# Patient Record
Sex: MALE | Race: WHITE | Employment: UNEMPLOYED | URBAN - METROPOLITAN AREA
[De-identification: names, ages, dates, MRNs, and addresses within clinical notes are randomized per-mention and may not be internally consistent; named-entity substitution may affect disease eponyms.]

---

## 2020-01-10 ENCOUNTER — MEDICAL CORRESPONDENCE (OUTPATIENT)
Dept: HEALTH INFORMATION MANAGEMENT | Facility: CLINIC | Age: 55
End: 2020-01-10

## 2020-01-10 ENCOUNTER — TRANSFERRED RECORDS (OUTPATIENT)
Dept: HEALTH INFORMATION MANAGEMENT | Facility: CLINIC | Age: 55
End: 2020-01-10

## 2020-02-04 ENCOUNTER — OFFICE VISIT (OUTPATIENT)
Dept: OPHTHALMOLOGY | Facility: CLINIC | Age: 55
End: 2020-02-04
Attending: OPHTHALMOLOGY
Payer: COMMERCIAL

## 2020-02-04 ENCOUNTER — HOSPITAL ENCOUNTER (OUTPATIENT)
Dept: CT IMAGING | Facility: CLINIC | Age: 55
Discharge: HOME | End: 2020-02-04
Attending: OPHTHALMOLOGY | Admitting: OPHTHALMOLOGY
Payer: COMMERCIAL

## 2020-02-04 DIAGNOSIS — E07.9 THYROID EYE DISEASE: Primary | ICD-10-CM

## 2020-02-04 DIAGNOSIS — E07.9 THYROID EYE DISEASE: ICD-10-CM

## 2020-02-04 DIAGNOSIS — H57.89 THYROID EYE DISEASE: Primary | ICD-10-CM

## 2020-02-04 DIAGNOSIS — H57.89 THYROID EYE DISEASE: ICD-10-CM

## 2020-02-04 DIAGNOSIS — H50.22 HYPOTROPIA OF LEFT EYE: ICD-10-CM

## 2020-02-04 DIAGNOSIS — E05.00 PROPTOSIS DUE TO THYROID DISORDER: Primary | ICD-10-CM

## 2020-02-04 PROCEDURE — 83516 IMMUNOASSAY NONANTIBODY: CPT | Mod: 59 | Performed by: OPHTHALMOLOGY

## 2020-02-04 PROCEDURE — 92285 EXTERNAL OCULAR PHOTOGRAPHY: CPT | Mod: ZF | Performed by: OPHTHALMOLOGY

## 2020-02-04 PROCEDURE — 70480 CT ORBIT/EAR/FOSSA W/O DYE: CPT

## 2020-02-04 PROCEDURE — 83519 RIA NONANTIBODY: CPT | Performed by: OPHTHALMOLOGY

## 2020-02-04 PROCEDURE — 86255 FLUORESCENT ANTIBODY SCREEN: CPT | Performed by: OPHTHALMOLOGY

## 2020-02-04 PROCEDURE — V2718 FRESNELL PRISM PRESS-ON LENS: HCPCS | Mod: ZF | Performed by: OPHTHALMOLOGY

## 2020-02-04 PROCEDURE — G0463 HOSPITAL OUTPT CLINIC VISIT: HCPCS | Mod: 25,ZF

## 2020-02-04 PROCEDURE — 36415 COLL VENOUS BLD VENIPUNCTURE: CPT | Performed by: OPHTHALMOLOGY

## 2020-02-04 PROCEDURE — 92060 SENSORIMOTOR EXAMINATION: CPT | Mod: ZF | Performed by: OPHTHALMOLOGY

## 2020-02-04 PROCEDURE — 83516 IMMUNOASSAY NONANTIBODY: CPT | Performed by: OPHTHALMOLOGY

## 2020-02-04 ASSESSMENT — REFRACTION_WEARINGRX
OD_AXIS: 110
OD_CYLINDER: +1.50
OD_SPHERE: -1.75
OS_ADD: +3.00
OD_ADD: +3.00
OS_CYLINDER: +2.25
OS_SPHERE: -2.75
OS_AXIS: 090
SPECS_TYPE: BIFOCAL

## 2020-02-04 ASSESSMENT — CUP TO DISC RATIO
OD_RATIO: 0.3
OS_RATIO: 0.2

## 2020-02-04 ASSESSMENT — CONF VISUAL FIELD
METHOD: TOYS
OD_NORMAL: 1
OS_NORMAL: 1

## 2020-02-04 ASSESSMENT — MARGIN REFLEX DISTANCE
OS_MRD1: 0.5
OD_MRD2: 10
OS_MRD2: 9
OD_MRD1: 1

## 2020-02-04 ASSESSMENT — TONOMETRY
OS_IOP_MMHG: 27
OD_IOP_MMHG: 21

## 2020-02-04 ASSESSMENT — VISUAL ACUITY
OD_CC: 20/20
OS_CC: 20/25
OS_CC+: -2
OD_CC+: -2
CORRECTION_TYPE: GLASSES
METHOD: SNELLEN - LINEAR

## 2020-02-04 ASSESSMENT — LAGOPHTHALMOS
OS_LAGOPHTHALMOS: 3
OD_LAGOPHTHALMOS: 4

## 2020-02-04 NOTE — NURSING NOTE
Chief Complaint(s) and History of Present Illness(es)     Thyroid Disease     Laterality: both eyes    Onset: gradual    Severity: mild    Frequency: constantly    Course: gradually worsening    Associated symptoms: double vision, dryness and redness    Treatments tried: artificial tears and glasses    Response to treatment: no improvement    Pain scale: 7/10              Comments     TSH-3.91, T4-0.94 (12/27/2019). Hyperthyroidism since 2000, S/P Thyroidectomy 2007. Has diplopia all the day. Images are stacked on top of each other. DV resolves when closes either eye. No prism in gls. He feels his vision is blurry at dn and nr. Has trouble looking out of bifocal. Difficult and painful to move eyes around. HA's constantly. Has red and teary eyes, uses AT's QPM to help. Did not like the AT ointment, felt made eyes worse. Very photophobic, wears sunglasses inside. Has noticed his eyes were very proptotic on diagnosis, but within the past few years they now feel heavy. Sometimes they did not close all the way at night. No FHx.

## 2020-02-04 NOTE — PROGRESS NOTES
HPI: Patient is a 54 year old male sent by Dr. Bernabe for consultation of thyroid eye disease. Diagnosed with Graves disease in 2005. He underwent thyroidectomy in 2007. He reports that he has had proptosis since being diagnosed with Graves disease. He thinks that his proptosis may be slightly worse. He reports having binocular diplopia that has been worse over the last year that is now nearly constant. He notes that around the time of being diagnosed with Graves disease he had eyelid retraction, but over the past 4-5 years has had ptosis. He has had dry eye and foreign body sensation and is using artificial tears at bedtime. Currently smokes 5-10 cigarettes; he has been smoking since he was 13 years old.  He received a pair of glasses in January and is unhappy about the size of the add segment in his glasses. Reports dysphagia and choking sensation over the last year, occasional drooling.  This is better over last few months.      Referring physician: Dr. Bernabe, [facility]    Thyroid history:  Diagnosed when? January 2019  AYERS: NA  Thyroidectomy: 2007 on levothyroxine 112 mcg daily    TSI (date):NA       TSH 3.91 and T4 0.94 12/27/19    Eye symptoms (since when):   Proptosis (better/worse/same since last visit): Initial   Diplopia(better/worse/same since last visit): Initial  Eyelid retraction(better/worse/same since last visit): Initial  Tearing(better/worse/same since last visit): Initial  Redness (better/worse/same since last visit): Initial  Pain ((better/worse/same since last visit): Initial  Pain to move the eyes (better/worse/same since last visit): Initial  Blurred vision: Initial    Ocular history:   Orbital decompression (date, details): NA  Strabismus surgery (date, details): NA  Eyelid surgery (date, details): NA    Exam:   Doreen (base):105/31/30     Better/worse same: Initial  Strabismus (better/worse/same): Initial  Eyelid retraction (better/worse/same): Initial    Boby Jewell is a 54  year old male with the following diagnoses:   1. Thyroid eye disease    2. Hypotropia of left eye       It is my impression that Donovan has active thyroid eye disease with history and findings suggestive of concurrent myasthenia gravis. Fatigue in upgaze and bilaterally weak orbicularis function. CT today with extraocular muscles consistent with thyroid eye disease. Recommend smoking cessation, starting selenium. Recommend continued use of artificial tears four times daily. Obtain baseline TSI and myasthenia antibodies ( Acetylcholine receptor first and then LRP4 / MUSK if acetylcholine receptor antibodies negative). If all antibodies negative then check single fiber electromyography.  Return to clinic in 4 months.        I spent a total of 45 minutes face to face with Boby Jewell during today's office visit.  Over 50% of this time was spent counseling the patient and/or coordinating care regarding his thyroid eye disease and probable myasthenia gravis.    Complete documentation of historical and exam elements from today's encounter can be found in the full encounter summary report (not reduplicated in this progress note).  I personally obtained the chief complaint(s) and history of present illness.  I confirmed and edited as necessary the review of systems, past medical/surgical history, family history, social history, and examination findings as documented by others; and I examined the patient myself.  I personally reviewed the relevant tests, images, and reports as documented above.  I formulated and edited as necessary the assessment and plan and discussed the findings and management plan with the patient and family.  I personally reviewed the ophthalmic test(s) associated with this encounter, agree with the interpretation(s) as documented by the resident/fellow, and have edited the corresponding report(s) as necessary.     MD Gerry Cardozo MD  Ophthalmology, PGY-5  Neuro-Ophthalmology  Fellow

## 2020-02-04 NOTE — LETTER
2020    RE: Boby Jewell  : 1965  MRN: [id number]    Dear Dr. Bernabe:    Thank you for referring your patient, Boby Jewell, to our multi-disciplinary thyroid eye disease clinic recently.  After a thorough history followed by a neuro-ophthalmology, strabismus, and oculoplastics examination, we came to the following conclusions:     HPI: Patient is a 54 year old male sent by Dr. Bernabe for consultation of thyroid eye disease. Diagnosed with Graves disease in . He underwent thyroidectomy in . He reports that he has had proptosis since being diagnosed with Graves disease. He thinks that his proptosis may be slightly worse. He reports having binocular diplopia that has been worse over the last year that is now nearly constant. He notes that around the time of being diagnosed with Graves disease he had eyelid retraction, but over the past 4-5 years has had ptosis. He has had dry eye and foreign body sensation and is using artificial tears at bedtime. Currently smokes 5-10 cigarettes; he has been smoking since he was 13 years old.  He received a pair of glasses in January and is unhappy about the size of the add segment in his glasses. Reports dysphagia and choking sensation over the last year, occasional drooling.  This is better over last few months.      Referring physician: Dr. Bernabe, [facility]    Thyroid history:  Diagnosed when? 2019  AYERS: NA  Thyroidectomy:  on levothyroxine 112 mcg daily    TSI (date):NA       TSH 3.91 and T4 0.94 19    Eye symptoms (since when):   Proptosis (better/worse/same since last visit): Initial   Diplopia(better/worse/same since last visit): Initial  Eyelid retraction(better/worse/same since last visit): Initial  Tearing(better/worse/same since last visit): Initial  Redness (better/worse/same since last visit): Initial  Pain ((better/worse/same since last visit): Initial  Pain to move the eyes (better/worse/same since last visit):  Initial  Blurred vision: Initial    Ocular history:   Orbital decompression (date, details): NA  Strabismus surgery (date, details): NA  Eyelid surgery (date, details): NA    Exam:   Doreen (base):105/31/30     Better/worse same: Initial  Strabismus (better/worse/same): Initial  Eyelid retraction (better/worse/same): Initial    Boby Jewell is a 54 year old male with the following diagnoses:   1. Thyroid eye disease    2. Hypotropia of left eye       It is my impression that Donovan has active thyroid eye disease with history and findings suggestive of concurrent myasthenia gravis. Fatigue in upgaze and bilaterally weak orbicularis function. CT today with extraocular muscles consistent with thyroid eye disease. Recommend smoking cessation, starting selenium. Recommend continued use of artificial tears four times daily. Obtain baseline TSI and myasthenia antibodies ( Acetylcholine receptor first and then LRP4 / MUSK if acetylcholine receptor antibodies negative). If all antibodies negative then check single fiber electromyography.  Return to clinic in 4 months.       Thank you for trusting us with the care of your patient.  For further exam details, please feel free to contact our office for additional records.  If you wish to contact us directly regarding this patient please email us or give our clinic a call to arrange a phone conversation.    Sincerely,    Shin Choi MD  , Neuro-Ophthalmology and Adult Strabismus  Department of Ophthalmology and Visual Neurosciences  [facility]  [email or web address]    Payam Rodriguez MD    Oculoplastic and Orbital Surgery   Department of Ophthalmology and Visual Neurosciences  [facility]  [email or web address]    DX: thyroid eye disease, probable myasthenia gravis

## 2020-02-06 LAB
ACHR BIND AB SER-SCNC: 0 NMOL/L (ref 0–0.4)
ACHR BLOCK AB/ACHR TOTAL SFR SER: 13 % (ref 0–26)
STRIA MUS IGG SER QL IF: NORMAL

## 2020-02-07 LAB — ACHR MOD AB/ACHR TOTAL SFR SER: 0 %

## 2020-02-12 ENCOUNTER — TELEPHONE (OUTPATIENT)
Dept: OPHTHALMOLOGY | Facility: CLINIC | Age: 55
End: 2020-02-12

## 2020-02-12 NOTE — TELEPHONE ENCOUNTER
"----- Message from Shin Choi MD sent at 2/11/2020  7:17 PM CST -----  Regarding: Labs  Sarah Beth,    This is a patient we recently saw that I believe has thyroid eye disease AND myasthenia gravis.  We ordered the first round of antibody testing for myasthenia gravis which came back negative.  I told him if those blood tests were negative I would order 2 additional send out labs (which I just ordered in Epic for LRP4 antibody and MUSK antibody).  Please call him and ask him to get those drawn.    If those are negative we will order a single fiber electromyography (\"needle test\").    He was made aware of this plan in clinic.  Please have him go to a [place] lab to get these labs drawn.    Thank you. - Shin     "

## 2020-02-18 DIAGNOSIS — H50.22 HYPOTROPIA OF LEFT EYE: ICD-10-CM

## 2020-02-18 LAB — MISCELLANEOUS TEST: NORMAL

## 2020-02-18 PROCEDURE — 36415 COLL VENOUS BLD VENIPUNCTURE: CPT | Performed by: OPHTHALMOLOGY

## 2020-02-18 PROCEDURE — 83519 RIA NONANTIBODY: CPT | Mod: 90 | Performed by: OPHTHALMOLOGY

## 2020-02-18 PROCEDURE — 99000 SPECIMEN HANDLING OFFICE-LAB: CPT | Performed by: OPHTHALMOLOGY

## 2020-02-21 LAB — MUSCLE SPECIFIC KINASE (MUSK) ABY IGG: 0 NMOL/L (ref 0–0.03)

## 2020-03-03 ENCOUNTER — TELEPHONE (OUTPATIENT)
Dept: OPHTHALMOLOGY | Facility: CLINIC | Age: 55
End: 2020-03-03

## 2020-03-03 DIAGNOSIS — H50.22 HYPOTROPIA OF LEFT EYE: ICD-10-CM

## 2020-03-03 DIAGNOSIS — G70.00 MYASTHENIA GRAVIS (H): Primary | ICD-10-CM

## 2020-03-03 DIAGNOSIS — H53.2 DOUBLE VISION: ICD-10-CM

## 2020-03-03 LAB — LAB SCANNED RESULT: NORMAL

## 2020-03-03 NOTE — TELEPHONE ENCOUNTER
Called patient back - patient wanting to go forward with sfEMG  Sent info over to [facility], they will contact patient to schedule

## 2020-03-03 NOTE — TELEPHONE ENCOUNTER
03/03/2020 11:06 AM Phone (Outgoing) Boby Jewell (Self) [phone] (H) Remove   Left Message - Called to let patient know that his lab results came back, all negative, but Dr. Choi still suspicious for MG - wanting to proceed with sfEMG, Requesting call back from patient to coordinate testing -em 11:11 AM March 3, 2020

## 2020-03-03 NOTE — TELEPHONE ENCOUNTER
----- Message from Shin Choi MD sent at 3/1/2020 11:13 AM CST -----  Regarding: MG ab testing results and sfemg test  Sarah Beth,    this patient's single antibody test for myasthenia came back negative.  I still believe that he has that diagnosis in addition to thyroid eye disease.  It is not rare for the antibody testing to be normal but for patients to still have myasthenia gravis.  Please call the patient and tell him that as I discussed before I would now like to proceed on with a single-fiber EMG (the needle test).    Please work to get him set up with our preferred provider for single fiber electromyography at [facility].    Thank you. - Shin

## 2020-03-05 ENCOUNTER — TELEPHONE (OUTPATIENT)
Dept: OPHTHALMOLOGY | Facility: CLINIC | Age: 55
End: 2020-03-05

## 2020-03-05 NOTE — TELEPHONE ENCOUNTER
03/05/2020 10:53 AM Phone (Incoming) Boby Jewell (Self) [phone] (H) Remove   Patient called with concerns about worsening headaches. He saw his internist recently who wondered if prism might be contributing (symptoms started worsening day after we applied temporary prism). He is wondering if he can remove the prism or if we have any recommendations for his headache symptoms? -em 10:54 AM March 5, 2020          Called to talk to Boby. I told him he could peel prism off and keep. See if this helps HA ithout it, if noting worse diplopia can put back on if it's worse. Talked him thru replacing it.

## 2020-03-18 ENCOUNTER — TELEPHONE (OUTPATIENT)
Dept: OPHTHALMOLOGY | Facility: CLINIC | Age: 55
End: 2020-03-18

## 2020-06-29 ENCOUNTER — TELEPHONE (OUTPATIENT)
Dept: OPHTHALMOLOGY | Facility: CLINIC | Age: 55
End: 2020-06-29

## 2020-06-29 NOTE — TELEPHONE ENCOUNTER
[facility] Call Center    Phone Message    May a detailed message be left on voicemail: yes     Reason for Call: Other: Pt would like a call back to reschedule the appointment he missed with Steph on 6/2/20 in thyroid eye.  Please follow up with the Pt.       Action Taken: Message routed to:  [facility] EYE    Travel Screening: Not Applicable

## 2020-06-30 NOTE — TELEPHONE ENCOUNTER
TIO 8/4/20 at 12:00pm at [facility] location ([address]   Left voicemail requesting call back to confirm/discuss -em 12:35 PM June 30, 2020                                                                                                                  Shin Choi MD [place], Eleanor    Caller: Unspecified (Yesterday,  9:15 AM)               Please bring to thyroid eye disease clinic in August.     Thank you. - Shin

## 2020-07-14 ENCOUNTER — TRANSFERRED RECORDS (OUTPATIENT)
Dept: HEALTH INFORMATION MANAGEMENT | Facility: CLINIC | Age: 55
End: 2020-07-14

## 2020-07-22 ENCOUNTER — TELEPHONE (OUTPATIENT)
Dept: OPHTHALMOLOGY | Facility: CLINIC | Age: 55
End: 2020-07-22

## 2020-07-22 NOTE — TELEPHONE ENCOUNTER
----- Message from Shin Choi MD sent at 7/22/2020  1:21 PM CDT -----  Regarding: sfEMG test  Sarah Beth, please call this patient and let him know that his single fiber electromyography test came back negative for definitive evidence of myasthenia gravis.  He should keep his next follow-up appointment with me in August as scheduled.  We can discuss next steps more at that visit.    Thank you. - Shin

## 2020-08-03 ENCOUNTER — TELEPHONE (OUTPATIENT)
Dept: OPHTHALMOLOGY | Facility: CLINIC | Age: 55
End: 2020-08-03

## 2020-08-03 NOTE — TELEPHONE ENCOUNTER
Left a voicemail to confirm the appointment for 8/4/2020. Also advised of clinic changes due to covid-19  Mask policy, (visitor restrictions, parking, etc.) Clinic phone number provided for questions.        Starla Hare

## 2020-08-31 ENCOUNTER — TELEPHONE (OUTPATIENT)
Dept: OPHTHALMOLOGY | Facility: CLINIC | Age: 55
End: 2020-08-31

## 2020-08-31 NOTE — TELEPHONE ENCOUNTER
Left a voicemail to confirm the appointment for 9/1/2020. Also advised of clinic changes due to covid-19 (mask policy,visitor restrictions, parking, etc.) Clinic phone number provided for questions.    Starla Hare

## 2020-09-01 ENCOUNTER — OFFICE VISIT (OUTPATIENT)
Dept: OPHTHALMOLOGY | Facility: CLINIC | Age: 55
End: 2020-09-01
Attending: OPHTHALMOLOGY
Payer: COMMERCIAL

## 2020-09-01 DIAGNOSIS — E07.9 THYROID EYE DISEASE: ICD-10-CM

## 2020-09-01 DIAGNOSIS — H50.22 HYPOTROPIA OF LEFT EYE: ICD-10-CM

## 2020-09-01 DIAGNOSIS — E05.00 PROPTOSIS DUE TO THYROID DISORDER: Primary | ICD-10-CM

## 2020-09-01 DIAGNOSIS — H57.89 THYROID EYE DISEASE: ICD-10-CM

## 2020-09-01 PROCEDURE — 92060 SENSORIMOTOR EXAMINATION: CPT | Mod: ZF | Performed by: OPHTHALMOLOGY

## 2020-09-01 PROCEDURE — 92285 EXTERNAL OCULAR PHOTOGRAPHY: CPT | Mod: ZF | Performed by: OPHTHALMOLOGY

## 2020-09-01 PROCEDURE — G0463 HOSPITAL OUTPT CLINIC VISIT: HCPCS | Mod: 25,ZF | Performed by: TECHNICIAN/TECHNOLOGIST

## 2020-09-01 RX ORDER — ATORVASTATIN CALCIUM 10 MG/1
TABLET, FILM COATED ORAL
COMMUNITY
Start: 2020-08-29

## 2020-09-01 RX ORDER — ASPIRIN 81 MG/1
81 TABLET, CHEWABLE ORAL
COMMUNITY
Start: 2018-12-26

## 2020-09-01 RX ORDER — LISINOPRIL 20 MG/1
20 TABLET ORAL
COMMUNITY
Start: 2019-12-27

## 2020-09-01 RX ORDER — LEVOTHYROXINE SODIUM 112 UG/1
112 TABLET ORAL
COMMUNITY
Start: 2019-12-27

## 2020-09-01 RX ORDER — GABAPENTIN 300 MG/1
CAPSULE ORAL
COMMUNITY
Start: 2020-08-07

## 2020-09-01 ASSESSMENT — TONOMETRY
IOP_METHOD: SINGLE ICARE
OS_IOP_MMHG: 20
OD_IOP_MMHG: 22

## 2020-09-01 ASSESSMENT — REFRACTION_WEARINGRX
OS_AXIS: 090
OD_CYLINDER: +1.50
SPECS_TYPE: BIFOCAL
OS_SPHERE: -2.75
OD_ADD: +3.00
OD_SPHERE: -1.75
OD_AXIS: 110
OS_ADD: +3.00
OS_CYLINDER: +2.25

## 2020-09-01 ASSESSMENT — VISUAL ACUITY
CORRECTION_TYPE: GLASSES
METHOD: SNELLEN - LINEAR
OD_CC: 20/30
OS_CC: 20/30
OD_CC+: -2

## 2020-09-01 ASSESSMENT — MARGIN REFLEX DISTANCE
OD_MRD1: 0
OS_MRD1: 1/2

## 2020-09-01 ASSESSMENT — CUP TO DISC RATIO
OD_RATIO: 0.2
OS_RATIO: 0.2

## 2020-09-01 ASSESSMENT — EXTERNAL EXAM - LEFT EYE: OS_EXAM: PTOSIS

## 2020-09-01 ASSESSMENT — EXTERNAL EXAM - RIGHT EYE: OD_EXAM: PTOSIS

## 2020-09-01 ASSESSMENT — LAGOPHTHALMOS: OD_LAGOPHTHALMOS: 1

## 2020-09-01 NOTE — NURSING NOTE
Chief Complaint(s) and History of Present Illness(es)     Thyroid Disease     Laterality: both eyes    Associated symptoms: dryness.  Negative for eye pain    Treatments tried: glasses and eye drops    Comments: Has fresnel on gls, notes diplopia most of the time, worse in am, no VA changes, no changes to strab, no changes to appearance of eyes, using drops am mostly, MG testing was negative

## 2020-09-01 NOTE — PROGRESS NOTES
HPI: Patient is a 54 year old male sent by Dr. Bernabe for consultation of thyroid eye disease. Diagnosed with Graves disease in 2005. He underwent thyroidectomy in 2007. He reports that he has had proptosis since being diagnosed with Graves disease. He thinks that his proptosis may be slightly worse. He reports having binocular diplopia that has been worse over the last year that is now nearly constant. He notes that around the time of being diagnosed with Graves disease he had eyelid retraction, but over the past 4-5 years has had ptosis. He has had dry eye and foreign body sensation and is using artificial tears at bedtime. Currently smokes 5-10 cigarettes; he has been smoking since he was 13 years old.  He received a pair of glasses in January and is unhappy about the size of the add segment in his glasses. Reports intermittent dysphagia and choking sensation over the last year    We had a high suspicion for MG last visit 6 months ago since he Fatigued in upgaze and bilaterally weak orbicularis function. But acetylcholine receptor antibodies, anti MUSK and anti LRP4 were negative    Double vision is worse when he wakes up in the morning and when he is exposed to sunlight. Ptosis is stable through out the day    EMG was unremarkable (orbicularis) 7/2020     Referring physician: Dr. Bernabe, [facility]     Thyroid history:  Diagnosed when? January 2019  AYERS: NA  Thyroidectomy: 2007 on levothyroxine 112 mcg daily     TSI (date):NA       TSH 3.91 and T4 0.94 12/27/19    CT ORBITAL WO CONTRAST 2/4/2020   Impression: Marked bilateral proptosis with presumed fatty degeneration of the extraocular muscles, presumably chronic sequelae of Graves' disease     Eye symptoms (since when):   Proptosis (better/worse/same since last visit): same  Diplopia(better/worse/same since last visit): same  Eyelid retraction(better/worse/same since last visit): ptosis stable  Tearing(better/worse/same since last visit): both eyes  same  Redness (better/worse/same since last visit): same  Pain ((better/worse/same since last visit): same  Pain to move the eyes (better/worse/same since last visit): same  Blurred vision: same     Ocular history:   Orbital decompression (date, details): NA  Strabismus surgery (date, details): NA  Eyelid surgery (date, details): NA    PMH: HTN     Exam:   Doreen (base):105/29/28 (zbb168/31/30)     Better/worse same: Initial  Strabismus (better/worse/same): same  Eyelid retraction (better/worse/same):ptosis stable   Boby Jewell is a 54 year old male with the following diagnoses:     1. Thyroid eye disease    While I was suspicious last visit that the patient may have myasthenia gravis, his single-fiber EMG test came back negative and his antibodies were negative.  He clearly has thyroid eye disease and at this point we believe he is inactive.  He suffers from symptoms of exposure.  After a discussion of risk benefits and alternatives patient has decided to proceed with orbital decompression.  The patient will likely require strabismus surgery afterwards    Plan ( Jennifer) :   Bilateral three wall orbital decompression (Lateral bone + fat, medial transcaruncular, floor transconj)     Complete documentation of historical and exam elements from today's encounter can be found in the full encounter summary report (not reduplicated in this progress note).  I personally obtained the chief complaint(s) and history of present illness.  I confirmed and edited as necessary the review of systems, past medical/surgical history, family history, social history, and examination findings as documented by others; and I examined the patient myself.  I personally reviewed the relevant tests, images, and reports as documented above.  I formulated and edited as necessary the assessment and plan and discussed the findings and management plan with the patient and family.  I personally reviewed the ophthalmic test(s) associated with  this encounter, agree with the interpretation(s) as documented by the resident/fellow, and have edited the corresponding report(s) as necessary.     MD Belia Cardozo MD: Neuro-ophthalmology fellow

## 2020-09-03 ENCOUNTER — TELEPHONE (OUTPATIENT)
Dept: OPHTHALMOLOGY | Facility: CLINIC | Age: 55
End: 2020-09-03

## 2020-09-03 NOTE — TELEPHONE ENCOUNTER
Called patient to schedule procedure with Dr. Payam Rodriguez, there was no answer.  Left message with my direct line [phone].

## 2020-09-07 DIAGNOSIS — H57.89 THYROID EYE DISEASE: ICD-10-CM

## 2020-09-07 DIAGNOSIS — E05.00 PROPTOSIS DUE TO THYROID DISORDER: ICD-10-CM

## 2020-09-07 DIAGNOSIS — E07.9 THYROID EYE DISEASE: ICD-10-CM

## 2020-09-07 PROCEDURE — 99000 SPECIMEN HANDLING OFFICE-LAB: CPT | Performed by: OPHTHALMOLOGY

## 2020-09-07 PROCEDURE — 36415 COLL VENOUS BLD VENIPUNCTURE: CPT | Performed by: OPHTHALMOLOGY

## 2020-09-07 PROCEDURE — 84445 ASSAY OF TSI GLOBULIN: CPT | Mod: 90 | Performed by: OPHTHALMOLOGY

## 2020-09-09 NOTE — TELEPHONE ENCOUNTER
Called patient to schedule procedure with Dr Rodriguez, there was no answer.  Left message with my direct line [phone].

## 2020-09-14 LAB — TSI SER-ACNC: 5.9 TSI INDEX
